# Patient Record
Sex: FEMALE | Employment: FULL TIME | URBAN - METROPOLITAN AREA
[De-identification: names, ages, dates, MRNs, and addresses within clinical notes are randomized per-mention and may not be internally consistent; named-entity substitution may affect disease eponyms.]

---

## 2019-11-27 ENCOUNTER — OFFICE VISIT (OUTPATIENT)
Dept: FAMILY MEDICINE CLINIC | Age: 44
End: 2019-11-27

## 2019-11-27 VITALS
HEART RATE: 77 BPM | OXYGEN SATURATION: 100 % | RESPIRATION RATE: 18 BRPM | HEIGHT: 64 IN | BODY MASS INDEX: 28.51 KG/M2 | DIASTOLIC BLOOD PRESSURE: 68 MMHG | TEMPERATURE: 97.6 F | SYSTOLIC BLOOD PRESSURE: 113 MMHG | WEIGHT: 167 LBS

## 2019-11-27 DIAGNOSIS — Z76.89 ENCOUNTER TO ESTABLISH CARE: ICD-10-CM

## 2019-11-27 DIAGNOSIS — N60.12 CYSTIC BREAST, LEFT: ICD-10-CM

## 2019-11-27 DIAGNOSIS — N60.11 FIBROCYSTIC DISEASE OF RIGHT BREAST: Primary | ICD-10-CM

## 2019-11-27 NOTE — PROGRESS NOTES
Chief Complaint   Patient presents with    New Patient    Breast Cyst     NEED REFERRAL     Visit Vitals  /68   Pulse 77   Temp 97.6 °F (36.4 °C) (Oral)   Resp 18   Ht 5' 4\" (1.626 m)   Wt 167 lb (75.8 kg)   SpO2 100%   BMI 28.67 kg/m²     1. Have you been to the ER, urgent care clinic since your last visit? Hospitalized since your last visit? No    2. Have you seen or consulted any other health care providers outside of the [address] [place] [address] since your last visit? Include any pap smears or colon screening.  No

## 2019-12-11 PROBLEM — N60.12 CYSTIC BREAST, LEFT: Status: ACTIVE | Noted: 2019-12-11

## 2019-12-11 PROBLEM — N60.11 FIBROCYSTIC DISEASE OF RIGHT BREAST: Status: ACTIVE | Noted: 2019-12-11

## 2019-12-11 NOTE — PROGRESS NOTES
Edita Bean is a 45 y.o.  female and presents with    Chief Complaint   Patient presents with    New Patient    Breast Cyst     NEED REFERRAL         Subjective:  Patient presents to establish care. Patient with concerns of bilateral breast cysts currently left worse than right. Patient has previous diagnosis and has had previous mammos associated with diagnostic masses. All have been cystic. No Known Allergies  History reviewed. No pertinent past medical history. History reviewed. No pertinent surgical history. Family History   Problem Relation Age of Onset    No Known Problems Father      Social History     Tobacco Use    Smoking status: Never Smoker    Smokeless tobacco: Never Used   Substance Use Topics    Alcohol use: Never     Frequency: Never       Review of Systems   Skin:        + cystic breast tissue painful         Objective:  Vitals:    11/27/19 1354   BP: 113/68   Pulse: 77   Resp: 18   Temp: 97.6 °F (36.4 °C)   TempSrc: Oral   SpO2: 100%   Weight: 167 lb (75.8 kg)   Height: 5' 4\" (1.626 m)   PainSc:   0 - No pain   LMP: 11/14/2019     General:   Well-groomed, well-nourished, in no distress, pleasant, alert, appropriate    Cardiovasc:   RRR,  no murmur, rubs or gallops. Pulmonary:    Lungs clear bilaterally, no wheezing, rales or rhonchi. Skin:    Cystic breast tissue bilateral various locations both breast with masses. Assessment/Plan:      1. Fibrocystic disease of right breast  Proceed with bilateral diagnostic mammo-associated with left cystic and right cystic breast disease. Patient symptomatic with cysts bilateral.  - ALEJANDRA MAMMO BI DX INCL CAD; Future    2. Cystic breast, left  - ALEJANDRA MAMMO BI DX INCL CAD; Future    3. Encounter to establish care  Reviewed history discussed CPE and labs patient states she will return to care for this. I have discussed the diagnosis with the patient and the intended plan as seen in the above orders.   The patient has received an after-visit summary and questions were answered concerning future plans. I have discussed medication side effects and warnings with the patient as well. I have reviewed the plan of care with the patient, accepted their input and they are in agreement with the treatment goals. Follow-up and Dispositions    · Return if symptoms worsen or fail to improve. More than 1/2 of this 30 minute visit was spent face to face in counselling and coordination of care, as described above. This document may have been created with the aid of dictation software. Text may contain errors, particularly phonetic errors.      Jesus Prom FNP-BC

## 2020-01-09 DIAGNOSIS — N60.12 CYSTIC BREAST, LEFT: ICD-10-CM

## 2020-01-09 DIAGNOSIS — N60.11 FIBROCYSTIC DISEASE OF RIGHT BREAST: ICD-10-CM
